# Patient Record
Sex: FEMALE
[De-identification: names, ages, dates, MRNs, and addresses within clinical notes are randomized per-mention and may not be internally consistent; named-entity substitution may affect disease eponyms.]

---

## 2018-11-12 PROBLEM — Z00.00 ENCOUNTER FOR PREVENTIVE HEALTH EXAMINATION: Status: ACTIVE | Noted: 2018-11-12

## 2019-01-03 ENCOUNTER — APPOINTMENT (OUTPATIENT)
Dept: ENDOCRINOLOGY | Facility: CLINIC | Age: 74
End: 2019-01-03

## 2019-03-28 ENCOUNTER — RECORD ABSTRACTING (OUTPATIENT)
Age: 74
End: 2019-03-28

## 2019-03-28 DIAGNOSIS — Z82.49 FAMILY HISTORY OF ISCHEMIC HEART DISEASE AND OTHER DISEASES OF THE CIRCULATORY SYSTEM: ICD-10-CM

## 2019-03-28 RX ORDER — MIRTAZAPINE 15 MG/1
15 TABLET, FILM COATED ORAL
Refills: 0 | Status: ACTIVE | COMMUNITY

## 2019-03-28 RX ORDER — INSULIN DETEMIR 100 [IU]/ML
INJECTION, SOLUTION SUBCUTANEOUS
Refills: 0 | Status: ACTIVE | COMMUNITY

## 2019-03-28 RX ORDER — AMLODIPINE BESYLATE 5 MG/1
5 TABLET ORAL DAILY
Refills: 0 | Status: ACTIVE | COMMUNITY

## 2019-03-28 RX ORDER — MULTIVITAMIN
CAPSULE ORAL
Refills: 0 | Status: ACTIVE | COMMUNITY

## 2019-03-28 RX ORDER — GABAPENTIN 100 MG/1
CAPSULE ORAL
Refills: 0 | Status: ACTIVE | COMMUNITY

## 2019-03-28 RX ORDER — CLOPIDOGREL BISULFATE 75 MG/1
75 TABLET, FILM COATED ORAL
Refills: 0 | Status: ACTIVE | COMMUNITY

## 2019-03-28 RX ORDER — NITROGLYCERIN 0.4 MG/1
0.4 TABLET SUBLINGUAL
Refills: 0 | Status: ACTIVE | COMMUNITY

## 2019-03-28 RX ORDER — CITALOPRAM HYDROBROMIDE 10 MG/1
TABLET, FILM COATED ORAL
Refills: 0 | Status: ACTIVE | COMMUNITY

## 2019-03-28 RX ORDER — ATORVASTATIN CALCIUM 10 MG/1
10 TABLET, FILM COATED ORAL DAILY
Refills: 0 | Status: ACTIVE | COMMUNITY

## 2019-03-28 RX ORDER — LISINOPRIL 30 MG/1
TABLET ORAL
Refills: 0 | Status: ACTIVE | COMMUNITY

## 2019-03-28 RX ORDER — CARVEDILOL 6.25 MG/1
6.25 TABLET, FILM COATED ORAL
Refills: 0 | Status: ACTIVE | COMMUNITY

## 2019-03-28 RX ORDER — REPAGLINIDE 2 MG/1
TABLET ORAL
Refills: 0 | Status: ACTIVE | COMMUNITY

## 2019-03-28 RX ORDER — OMEPRAZOLE 20 MG/1
20 CAPSULE, DELAYED RELEASE ORAL
Refills: 0 | Status: ACTIVE | COMMUNITY

## 2019-03-28 RX ORDER — MULTIVITAMIN
TABLET ORAL
Refills: 0 | Status: ACTIVE | COMMUNITY

## 2019-03-28 RX ORDER — HALOPERIDOL 0.5 MG/1
0.5 TABLET ORAL
Refills: 0 | Status: ACTIVE | COMMUNITY

## 2019-03-28 RX ORDER — SITAGLIPTIN 100 MG/1
100 TABLET, FILM COATED ORAL
Refills: 0 | Status: ACTIVE | COMMUNITY

## 2019-03-28 RX ORDER — DOCUSATE SODIUM 100 MG/1
100 CAPSULE ORAL
Refills: 0 | Status: ACTIVE | COMMUNITY

## 2019-03-28 RX ORDER — GUAIFENESIN 1200 MG/1
TABLET, EXTENDED RELEASE ORAL
Refills: 0 | Status: ACTIVE | COMMUNITY

## 2019-04-01 ENCOUNTER — APPOINTMENT (OUTPATIENT)
Dept: ENDOCRINOLOGY | Facility: CLINIC | Age: 74
End: 2019-04-01
Payer: COMMERCIAL

## 2019-04-01 VITALS
DIASTOLIC BLOOD PRESSURE: 78 MMHG | SYSTOLIC BLOOD PRESSURE: 126 MMHG | BODY MASS INDEX: 33.65 KG/M2 | WEIGHT: 222 LBS | HEART RATE: 64 BPM | HEIGHT: 68 IN

## 2019-04-01 DIAGNOSIS — E78.00 PURE HYPERCHOLESTEROLEMIA, UNSPECIFIED: ICD-10-CM

## 2019-04-01 DIAGNOSIS — E01.0 IODINE-DEFICIENCY RELATED DIFFUSE (ENDEMIC) GOITER: ICD-10-CM

## 2019-04-01 DIAGNOSIS — I10 ESSENTIAL (PRIMARY) HYPERTENSION: ICD-10-CM

## 2019-04-01 DIAGNOSIS — E11.9 TYPE 2 DIABETES MELLITUS W/OUT COMPLICATIONS: ICD-10-CM

## 2019-04-01 DIAGNOSIS — E55.9 VITAMIN D DEFICIENCY, UNSPECIFIED: ICD-10-CM

## 2019-04-01 LAB — GLUCOSE BLDC GLUCOMTR-MCNC: 156

## 2019-04-01 PROCEDURE — 99214 OFFICE O/P EST MOD 30 MIN: CPT

## 2019-04-01 PROCEDURE — 82962 GLUCOSE BLOOD TEST: CPT

## 2019-04-01 NOTE — HISTORY OF PRESENT ILLNESS
[FreeTextEntry1] :  72 yo  lady with dementia in wheelchair, lives in NH, coming for f/u DM2for at least 30 yrs likely DR per daughter, CAD\par      daughter today here with the patient brings med list, BS log\par also labs results brought in \par        PMHx CAD Dr Laila George , s/p transaortic valce replacement 2016 PVD, Dementia, HTN, HLD, Type 2 DM \par        last EGFR 49 8/8, Cr\par        Type 2 DM many years ago on routine blood work. Father had IDDM and mother on metformin\par        HgbA1c has improved down to 6.8% 18\par        has also anemia Hg 10.5\par        Currently:\par        on Levemir 5units qhs\par        Novolog SS \par        101-150 4units\par        151-200 6units\par        201-250 8units etc \par bedtime Novolog starts at 250 and above takes only 2units per med list \par my recommendations from last visit not followed see my last note\par        Victoza 1.8mg SC weekly \par        Januvia 100mg PO QDaily\par        No CP, SOB, N,V, Abdominal pain, Constipation or diarrhea\par        Checks BG levels AC/HS\par        Fastin-160\par        Pre lunch: 266-317, 198\par      \par        Bedtime:\par        190-300 \par per BS log they checked their sugar only fasting and at bedtime\par \par        Secondary prevention\par        Cardiology May/2018. Doris. Had valve replacement. \par        Lisinopril 2.5mg PO Qdaily and ASA/Plavix daily\par        Cr 1.0, K 5.2, eGFR 51\par        LDL: 79 on atorvostatin, LFTs nl 106 on 3/19 , \par        Opthamology: Uses glasses. No blurry vision or loss vision. \par        Podiatry: Q2-3 months. \par        Dental on site. \par        TSH 1.43 18\par        total cholesterol 127.

## 2019-04-01 NOTE — PHYSICAL EXAM
[Alert] : alert [No Acute Distress] : no acute distress [Well Nourished] : well nourished [Well Developed] : well developed [Normal Sclera/Conjunctiva] : normal sclera/conjunctiva [EOMI] : extra ocular movement intact [No Proptosis] : no proptosis [Normal Oropharynx] : the oropharynx was normal [No Respiratory Distress] : no respiratory distress [No Accessory Muscle Use] : no accessory muscle use [Clear to Auscultation] : lungs were clear to auscultation bilaterally [Normal Rate] : heart rate was normal  [Normal S1, S2] : normal S1 and S2 [Regular Rhythm] : with a regular rhythm [Pedal Pulses Normal] : the pedal pulses are present [No Edema] : there was no peripheral edema [Normal Bowel Sounds] : normal bowel sounds [Not Tender] : non-tender [Soft] : abdomen soft [Not Distended] : not distended [Post Cervical Nodes] : posterior cervical nodes [Anterior Cervical Nodes] : anterior cervical nodes [Axillary Nodes] : axillary nodes [Normal] : normal and non tender [No Spinal Tenderness] : no spinal tenderness [Spine Straight] : spine straight [No Stigmata of Cushings Syndrome] : no stigmata of cushings syndrome [Normal Gait] : normal gait [Normal Strength/Tone] : muscle strength and tone were normal [No Rash] : no rash [Acanthosis Nigricans] : no acanthosis nigricans [Normal Reflexes] : deep tendon reflexes were 2+ and symmetric [No Tremors] : no tremors [Oriented x3] : oriented to person, place, and time [de-identified] : mild thyromegaly

## 2019-04-19 ENCOUNTER — HOSPITAL ENCOUNTER (OUTPATIENT)
Dept: HOSPITAL 74 - JER | Age: 74
Setting detail: OBSERVATION
LOS: 1 days | Discharge: NURSING, REHAB OR LONG-TERM CARE | End: 2019-04-20
Payer: COMMERCIAL

## 2019-04-19 VITALS — BODY MASS INDEX: 27.4 KG/M2

## 2019-04-19 DIAGNOSIS — I35.0: ICD-10-CM

## 2019-04-19 DIAGNOSIS — R07.9: Primary | ICD-10-CM

## 2019-04-19 DIAGNOSIS — E11.9: ICD-10-CM

## 2019-04-19 DIAGNOSIS — Z87.11: ICD-10-CM

## 2019-04-19 DIAGNOSIS — F03.90: ICD-10-CM

## 2019-04-19 DIAGNOSIS — I25.119: ICD-10-CM

## 2019-04-19 DIAGNOSIS — I73.9: ICD-10-CM

## 2019-04-19 DIAGNOSIS — F32.9: ICD-10-CM

## 2019-04-19 DIAGNOSIS — E78.5: ICD-10-CM

## 2019-04-19 DIAGNOSIS — I11.0: ICD-10-CM

## 2019-04-19 DIAGNOSIS — Z79.82: ICD-10-CM

## 2019-04-19 DIAGNOSIS — F09: ICD-10-CM

## 2019-04-19 DIAGNOSIS — Z86.718: ICD-10-CM

## 2019-04-19 DIAGNOSIS — Z79.4: ICD-10-CM

## 2019-04-19 DIAGNOSIS — I50.1: ICD-10-CM

## 2019-04-19 LAB
ALBUMIN SERPL-MCNC: 3.9 G/DL (ref 3.4–5)
ALP SERPL-CCNC: 92 U/L (ref 45–117)
ALT SERPL-CCNC: 18 U/L (ref 13–61)
ANION GAP SERPL CALC-SCNC: 4 MMOL/L (ref 8–16)
APPEARANCE UR: CLEAR
APTT BLD: 31.3 SECONDS (ref 25.2–36.5)
AST SERPL-CCNC: 13 U/L (ref 15–37)
BACTERIA #/AREA URNS HPF: 1761.8 /HPF
BASOPHILS # BLD: 0.7 % (ref 0–2)
BILIRUB SERPL-MCNC: 0.3 MG/DL (ref 0.2–1)
BILIRUB UR STRIP.AUTO-MCNC: NEGATIVE MG/DL
BUN SERPL-MCNC: 17 MG/DL (ref 7–18)
CALCIUM SERPL-MCNC: 9.3 MG/DL (ref 8.5–10.1)
CASTS #/AREA URNS LPF: 4 /LPF (ref 0–8)
CHLORIDE SERPL-SCNC: 107 MMOL/L (ref 98–107)
CO2 SERPL-SCNC: 27 MMOL/L (ref 21–32)
COLOR UR: YELLOW
CREAT SERPL-MCNC: 0.9 MG/DL (ref 0.55–1.3)
DEPRECATED RDW RBC AUTO: 13.1 % (ref 11.6–15.6)
EOSINOPHIL # BLD: 1.2 % (ref 0–4.5)
EPITH CASTS URNS QL MICRO: 2 /HPF
GLUCOSE SERPL-MCNC: 158 MG/DL (ref 74–106)
HCT VFR BLD CALC: 35.4 % (ref 32.4–45.2)
HGB BLD-MCNC: 11.8 GM/DL (ref 10.7–15.3)
INR BLD: 1.17 (ref 0.83–1.09)
KETONES UR QL STRIP: (no result)
LEUKOCYTE ESTERASE UR QL STRIP.AUTO: (no result)
LYMPHOCYTES # BLD: 16.8 % (ref 8–40)
MCH RBC QN AUTO: 29.8 PG (ref 25.7–33.7)
MCHC RBC AUTO-ENTMCNC: 33.5 G/DL (ref 32–36)
MCV RBC: 88.9 FL (ref 80–96)
MONOCYTES # BLD AUTO: 8.1 % (ref 3.8–10.2)
NEUTROPHILS # BLD: 73.2 % (ref 42.8–82.8)
NITRITE UR QL STRIP: POSITIVE
PH UR: 5 [PH] (ref 5–8)
PLATELET # BLD AUTO: 244 K/MM3 (ref 134–434)
PMV BLD: 9.4 FL (ref 7.5–11.1)
POTASSIUM SERPLBLD-SCNC: 4.9 MMOL/L (ref 3.5–5.1)
PROT SERPL-MCNC: 7.3 G/DL (ref 6.4–8.2)
PROT UR QL STRIP: (no result)
PROT UR QL STRIP: NEGATIVE
PT PNL PPP: 13.8 SEC (ref 9.7–13)
RBC # BLD AUTO: 0 /HPF (ref 0–4)
RBC # BLD AUTO: 3.98 M/MM3 (ref 3.6–5.2)
SODIUM SERPL-SCNC: 138 MMOL/L (ref 136–145)
SP GR UR: 1.03 (ref 1.01–1.03)
UROBILINOGEN UR STRIP-MCNC: 0.2 MG/DL (ref 0.2–1)
WBC # BLD AUTO: 8.9 K/MM3 (ref 4–10)
WBC # UR AUTO: 2 /HPF (ref 0–5)

## 2019-04-19 PROCEDURE — 3E03329 INTRODUCTION OF OTHER ANTI-INFECTIVE INTO PERIPHERAL VEIN, PERCUTANEOUS APPROACH: ICD-10-PCS

## 2019-04-19 PROCEDURE — 3E013VG INTRODUCTION OF INSULIN INTO SUBCUTANEOUS TISSUE, PERCUTANEOUS APPROACH: ICD-10-PCS

## 2019-04-19 PROCEDURE — 3E013GC INTRODUCTION OF OTHER THERAPEUTIC SUBSTANCE INTO SUBCUTANEOUS TISSUE, PERCUTANEOUS APPROACH: ICD-10-PCS

## 2019-04-19 PROCEDURE — 3E023GC INTRODUCTION OF OTHER THERAPEUTIC SUBSTANCE INTO MUSCLE, PERCUTANEOUS APPROACH: ICD-10-PCS

## 2019-04-19 PROCEDURE — G0378 HOSPITAL OBSERVATION PER HR: HCPCS

## 2019-04-19 RX ADMIN — HEPARIN SODIUM SCH UNIT: 5000 INJECTION, SOLUTION INTRAVENOUS; SUBCUTANEOUS at 22:57

## 2019-04-19 RX ADMIN — CARVEDILOL SCH MG: 6.25 TABLET, FILM COATED ORAL at 22:57

## 2019-04-19 RX ADMIN — INSULIN ASPART SCH: 100 INJECTION, SOLUTION INTRAVENOUS; SUBCUTANEOUS at 22:58

## 2019-04-19 NOTE — HP
CHIEF COMPLAINT: chest pain 





PCP: Vinayak  





HISTORY OF PRESENT ILLNESS:


73 year old female with baseline dementia from [facility] brought in to 

hospital because of AMS and complaining of chest pain which started about 

afternoon today. Chest pain was allegedly improved with nitroglycerin in 

nursing home. At bedside patient denied chest pain and did not appear to be in 

distress. Most of history obtained from daughter who is a PA. 





ER course was notable for:


(1) cxr 


(2)ekg 


(3) head ct 





Recent Travel: no 





PAST MEDICAL HISTORY: HTN, HLD, DVT, dementia, anemia, depression, CAD s/p 2 

stents- 2016, PVD, DM, AV s/p TAVR - 2016  moderate to severe and peptic ulcer 

disease





PAST SURGICAL HISTORY: as above 











Social History:


Smoking:no 


Alcohol: no no  


Drugs: 





Family History: unknown 


Allergies





No Known Allergies Allergy (Verified 04/19/19 17:09)


 








HOME MEDICATIONS:


 Home Medications











 Medication  Instructions  Recorded


 


Acetaminophen [Mapap] 650 mg PO Q6H PRN 12/29/14


 


Citalopram Hydrobromide [Celexa -] 20 mg PO DAILY 12/29/14


 


Gabapentin 300 mg PO DAILY 12/29/14


 


Multivitamins [Multivit ([facility] 1 tab PO DAILY 12/29/14





Formulary)]  


 


Ascorbate Calcium [Vitamin C] 500 mg PO DAILY 05/21/16


 


Cholecalciferol (Vitamin D3) 1,000 unit PO DAILY 05/21/16





[Vitamin D]  


 


Haloperidol [Haldol -] 0.25 mg PO BID 05/21/16


 


Sitagliptin Phosphate [Januvia] 100 mg PO DAILY 05/21/16


 


Aspirin 81 mg PO DAILY 04/19/19


 


Atorvastatin Calcium 10 mg PO DAILY 04/19/19


 


Carvedilol 6.25 mg PO BID 04/19/19


 


Clopidogrel Bisulfate [Clopidogrel] 75 mg PO DAILY 04/19/19


 


Cyanocobalamin (Vitamin B-12) 1,000 mcg IJ MONTHLY 04/19/19





[Cyanocobalamin Injection]  


 


Docusate Sodium [Colace -] 300 mg PO HS 04/19/19


 


Ferrous Sulfate 325 mg PO DAILY 04/19/19


 


Insulin Aspart [Novolog] 100 unit SQ AC 04/19/19


 


Insulin Detemir [Levemir Flextouch] 5 unit SQ HS 04/19/19


 


Lactobacillus Acidophilus [Bacid -] 1 each PO DAILY 04/19/19


 


Liraglutide [Victoza 3-Vernon] 1.8 mg SQ DAILY 04/19/19


 


Omeprazole 20 mg PO DAILY 04/19/19


 


Sennosides [Senna] 8.6 mg PO HS 04/19/19








REVIEW OF SYSTEMS - unable to obtain as patient has severe dementia  





PHYSICAL EXAMINATION


 Vital Signs - 24 hr











  04/19/19 04/19/19





  17:00 21:06


 


Temperature 98.6 F 98.5 F


 


Pulse Rate 80 


 


Pulse Rate [  88





Left Radial]  


 


Respiratory 16 19





Rate  


 


Blood Pressure 170/69 


 


Blood Pressure  164/79





[Right Arm]  


 


O2 Sat by Pulse 100 97





Oximetry (%)  











GENERAL: Awake, alert, disoriented, demented, bedbound 


HEAD: Normal with no signs of trauma.


EYES: Pupils equal, round and reactive to light, extraocular movements intact, 

sclera anicteric, conjunctiva clear. No lid lag.


EARS, NOSE, THROAT: Ears normal, nares patent, oropharynx clear without 

exudates. Moist mucous membranes.


NECK: Normal range of motion, supple without lymphadenopathy, JVD, or masses.


LUNGS: Breath sounds equal, clear to auscultation bilaterally. No wheezes, and 

no crackles. No accessory muscle use.


HEART: Regular rate and rhythm, normal S1 and S2+ systolic murmur 


ABDOMEN: Soft, nontender, not distended, normoactive bowel sounds, no guarding, 

no rebound, no masses. 


MUSCULOSKELETAL: Normal range of motion at all joints. No bony deformities or 

tenderness. No CVA tenderness.


UPPER EXTREMITIES: 2+ pulses, warm, well-perfused. No cyanosis. No clubbing. No 

peripheral edema.


LOWER EXTREMITIES: 2+ pulses, warm, well-perfused. No calf tenderness. No 

peripheral edema. 


NEUROLOGICAL:  Cranial nerves II-XII intact. Normal speech.


PSYCHIATRIC: dementia 


SKIN: Warm, dry, normal turgor, no rashes or lesions noted, normal capillary 

refill. 











 Laboratory Results - last 24 hr











  04/19/19 04/19/19 04/19/19





  17:39 17:40 17:40


 


WBC   8.9 


 


RBC   3.98 


 


Hgb   11.8 


 


Hct   35.4  D 


 


MCV   88.9 


 


MCH   29.8  D 


 


MCHC   33.5 


 


RDW   13.1  D 


 


Plt Count   244 


 


MPV   9.4  D 


 


Absolute Neuts (auto)   6.5 


 


Neutrophils %   73.2 


 


Lymphocytes %   16.8 


 


Monocytes %   8.1 


 


Eosinophils %   1.2 


 


Basophils %   0.7 


 


Nucleated RBC %   0 


 


PT with INR   


 


INR   


 


PTT (Actin FS)   


 


Sodium    138


 


Potassium    4.9


 


Chloride    107


 


Carbon Dioxide    27


 


Anion Gap    4 L


 


BUN    17


 


Creatinine    0.9


 


Creat Clearance w eGFR    61.21


 


Random Glucose    158 H


 


Calcium    9.3


 


Total Bilirubin    0.3


 


AST    13 L


 


ALT    18


 


Alkaline Phosphatase    92


 


Troponin I    < 0.02


 


Total Protein    7.3


 


Albumin    3.9


 


Urine Color  Yellow  


 


Urine Appearance  Clear  


 


Urine pH  5.0  


 


Ur Specific Gravity  1.027  


 


Urine Protein  1+ H  


 


Urine Glucose (UA)  Negative  


 


Urine Ketones  Trace H  


 


Urine Blood  Negative  


 


Urine Nitrite  Positive H  


 


Urine Bilirubin  Negative  


 


Urine Urobilinogen  0.2  


 


Ur Leukocyte Esterase  Trace  


 


Urine WBC (Auto)  2  


 


Urine RBC (Auto)  0  


 


Urine Casts (Auto)  4  


 


U Epithel Cells (Auto)  2.0  


 


Urine Bacteria (Auto)  1761.8  














  04/19/19





  17:40


 


WBC 


 


RBC 


 


Hgb 


 


Hct 


 


MCV 


 


MCH 


 


MCHC 


 


RDW 


 


Plt Count 


 


MPV 


 


Absolute Neuts (auto) 


 


Neutrophils % 


 


Lymphocytes % 


 


Monocytes % 


 


Eosinophils % 


 


Basophils % 


 


Nucleated RBC % 


 


PT with INR  13.80 H


 


INR  1.17 H


 


PTT (Actin FS)  31.3


 


Sodium 


 


Potassium 


 


Chloride 


 


Carbon Dioxide 


 


Anion Gap 


 


BUN 


 


Creatinine 


 


Creat Clearance w eGFR 


 


Random Glucose 


 


Calcium 


 


Total Bilirubin 


 


AST 


 


ALT 


 


Alkaline Phosphatase 


 


Troponin I 


 


Total Protein 


 


Albumin 


 


Urine Color 


 


Urine Appearance 


 


Urine pH 


 


Ur Specific Gravity 


 


Urine Protein 


 


Urine Glucose (UA) 


 


Urine Ketones 


 


Urine Blood 


 


Urine Nitrite 


 


Urine Bilirubin 


 


Urine Urobilinogen 


 


Ur Leukocyte Esterase 


 


Urine WBC (Auto) 


 


Urine RBC (Auto) 


 


Urine Casts (Auto) 


 


U Epithel Cells (Auto) 


 


Urine Bacteria (Auto) 





Imaging studies reviewed 


ekg with no acute ischemic changes 





ASSESSMENT/PLAN:


#Chest pain- r/o ACS given prior CAD and TAVR. May also be GERD/dyspepsia 


-tele/obs 


-trend troponins 


-echo 


-NGL SL prn 


-consider cardiology evaluation


-PPI empirically 





#CAD history 


-c/w home dose plavix, ASA 


-statin 


-metoprolol home dose  





#DM 


-insulin novolog sliding scale 


-levemir 


-a1c 


-diabetic diet 





#Dementia 


-bed rest 


-fall precautions 





#DVT ppx - prior hx of DVT- off coumadin as per med list 


-heparin sc for moderate to high risk patient 


-confirm that off AC in am 





Visit type





- Emergency Visit


Emergency Visit: Yes


ED Registration Date: 04/19/19


Care time: The patient presented to the Emergency Department on the above date 

and was hospitalized for further evaluation of their emergent condition.





- New Patient


This patient is new to me today: Yes


Date on this admission: 04/19/19





- Critical Care


Critical Care patient: No

## 2019-04-19 NOTE — PDOC
Documentation entered by Dorothea Houston SCRIBE, acting as scribe for Roddy Greenwood MD.








Roddy Greenwood MD:  This documentation has been prepared by the Celso abdi Collisia, SCRIBE, under my direction and personally reviewed by me in its 

entirety.  I confirm that the documentation accurately reflects all work, 

treatment, procedures, and medical decision making performed by me.  





Attending Attestation





- Resident


Resident Name: Patricia Malone





- ED Attending Attestation


I have performed the following: I have examined & evaluated the patient, The 

case was reviewed & discussed with the resident, I agree w/resident's findings 

& plan, Exceptions are as noted





- HPI


HPI: 





04/19/19 18:07


The patient is a 73 year old female with a significant past medical history of 

hypertension, hyperlipidemia, dementia, anemia, depression, CAD, PVD, diabetes, 

and peptic ulcers who presents to the emergency department via EMS from [facility] with chest pain since earlier today. Pt's daughter states that the 

patient received nitro prior to arrival to ED which seemed to have relieved her 

reported chest pain. The patient's daughter also reports that the patient has 

also been altered which often occurs when the patient has a UTI. As per nursing 

home, the patient was noted with psychotic episodes-kicking her overhead table 

down, stripping her bed covers and throwing everything on the floor frequently 

screaming out very loudly and displaying visual hallucination. NH staff also 

state that the patient began to clutch her chest and patient received 1 dose of 

0.4 mg of Nitroglycerin sublingual. No other symptoms or complaints are 

reported but history is limited due to pt's dementia. 











- Physicial Exam


PE: 





04/19/19 19:34


agree with resident exam 





- Medical Decision Making





04/19/19 19:37


72yo F with MMP including dementia and CAD presents to the ED with AMS, 

agitation and CP. Vitals with elevated BP, otherwise unremarkable. 


With regards to AMS, plan to do broad w/u including metabolic vs neurologic vs 

infectious vs ischemic pathology


With regards to CP, pt with elevated heart score, plan to check cardiac enzymes

, admit for cardiac w/u.





**Heart Score/ECG Review


  ** #1





04/19/19 19:35


Twelve-lead EKG was performed and reviewed by me. NSR, rate 79, normal axis. No 

YVES

## 2019-04-19 NOTE — PDOC
History of Present Illness





- General


Chief Complaint: Chest Pain


Stated Complaint: Chest Pain


Time Seen by Provider: 04/19/19 17:03


History Source: Family, Nursing Home Records


Exam Limitations: No Limitations





- History of Present Illness


Initial Comments: 





04/19/19 17:04


73 year old female from [facility] with significant medical hx of HTN, HLD, 

DVT,dementia, anemia, depression, CAD, PVD, DM, Aortic valve sclerosis- 

moderate to severe and peptic ulcer disease presents with altered mental status 

and complaints of chest pain as witnessed by the staff at [facility]. The staff 

informed daughter that the patient began complaining of chest pain than started 

yelling and throwing things. The physician at the facility gave the patient 

nitro and sent the patient to the ED. The daughter notes that in the past the 

patient has become altered due to a UTI and notes that the patients episode was 

quite different than her typical dementia. The patient reports resolution of 

chest pain at bedside. No recent fever, n/v/d/c, the patient has baseline 

urinary and fecal incontinence and wears a diaper. 








Past History





- Past Medical History


Allergies/Adverse Reactions: 


 Allergies











Allergy/AdvReac Type Severity Reaction Status Date / Time


 


No Known Allergies Allergy   Verified 04/19/19 17:09











Home Medications: 


Ambulatory Orders





Acetaminophen [Mapap] 650 mg PO Q6H PRN 12/29/14 


Citalopram Hydrobromide [Celexa -] 20 mg PO DAILY 12/29/14 


Gabapentin 300 mg PO DAILY 12/29/14 


Multivitamins [Multivit ([facility] Formulary)] 1 tab PO DAILY 12/29/14 


Cholecalciferol (Vitamin D3) [Vitamin D3] 1,000 unit PO DAILY 05/21/16 


Haloperidol [Haldol -] 0.25 mg PO BID 05/21/16 


Sitagliptin Phosphate [Januvia] 100 mg PO DAILY 05/21/16 


Aspirin 81 mg PO DAILY 04/19/19 


Atorvastatin Calcium 10 mg PO DAILY 04/19/19 


Carvedilol 6.25 mg PO BID 04/19/19 


Clopidogrel Bisulfate [Clopidogrel] 75 mg PO DAILY 04/19/19 


Cyanocobalamin (Vitamin B-12) [Cyanocobalamin Injection] 1,000 mcg IJ MONTHLY 04 /19/19 


Docusate Sodium [Colace -] 300 mg PO HS 04/19/19 


Ferrous Sulfate 325 mg PO DAILY 04/19/19 


Insulin Aspart [Novolog] 100 unit SQ AC 04/19/19 


Insulin Detemir [Levemir Flextouch] 5 unit SQ HS 04/19/19 


Lactobacillus Acidophilus [Bacid -] 1 each PO DAILY 04/19/19 


Liraglutide [Victoza 3-Vernon] 1.8 mg SQ DAILY 04/19/19 


Omeprazole 20 mg PO DAILY 04/19/19 


Sennosides [Senna] 8.6 mg PO HS 04/19/19 


Heparin - 5,000 unit SQ BID  vial 04/20/19 


Insulin (Levemir) [Levemir Vial] 5 units SQ HS  units 04/20/19 


Nitroglycerin Sublingual [Nitrostat -] 0.4 mg SL Q5M PRN  tab 04/20/19 








Anemia: No


Asthma: No


Cancer: No


Cardiac Disorders: No


CVA: No


COPD: No


CHF: No


DVT: Yes


Dementia: Yes


Diabetes: Yes


GI Disorders: Yes (GERD)


 Disorders: No


HTN: No


Hypercholesterolemia: Yes


Liver Disease: No


Seizures: No


Thyroid Disease: No





- Surgical History


Abdominal Surgery: No


Appendectomy: No


Cardiac Surgery: No


Cholecystectomy: No


Lung Surgery: No


Neurologic Surgery: No


Orthopedic Surgery: No





- Suicide/Smoking/Psychosocial Hx


Smoking History: Unknown if ever smoked


Have you smoked in the past 12 months: No


Number of Cigarettes Smoked Daily: 0


Hx Alcohol Use: No


Drug/Substance Use Hx: No


Substance Use Type: None


Hx Substance Use Treatment: No





**Review of Systems





- Review of Systems


Able to Perform ROS?: Yes


Comments:: 





04/19/19 18:25


GENERAL/CONSTITUTIONAL: No fever or chills. No weakness.


HEAD, EYES, EARS, NOSE AND THROAT: No change in vision. No ear pain or 

discharge. No sore throat.


CARDIOVASCULAR: No shortness of breath


RESPIRATORY: No cough, wheezing, or hemoptysis.


GASTROINTESTINAL: No nausea, vomiting, diarrhea or constipation.


GENITOURINARY: No dysuria, frequency, or change in urination.


MUSCULOSKELETAL: No joint or muscle swelling or pain. No neck or back pain.


SKIN: No rash


NEUROLOGIC: No headache, vertigo, loss of consciousness, or change in strength/

sensation.


ENDOCRINE: No increased thirst. No abnormal weight change


HEMATOLOGIC/LYMPHATIC: No anemia, easy bleeding, or history of blood clots.


ALLERGIC/IMMUNOLOGIC: No hives or skin allergy.








*Physical Exam





- Physical Exam


Comments: 





04/19/19 18:25


GENERAL: dementia, in no acute distress


HEAD: No signs of trauma, normocephalic, atraumatic 


EYES: PERRLA, EOMI, sclera anicteric, conjunctiva clear


ENT: oropharynx clear without exudates. Moist mucosa


NECK: Normal ROM, supple


LUNGS: No distress, speaks full sentences, clear to auscultation bilaterally 


HEART: Regular rate and rhythm, normal S1 and S2, no murmurs, rubs or gallops, 

peripheral pulses normal and equal bilaterally. 


ABDOMEN: Soft, nontender, normoactive bowel sounds.  No guarding, no rebound.  

No masses


EXTREMITIES : Normal inspection, Normal range of motion, no edema.  No clubbing 

or cyanosis. R heel stage 1 well healing ulcer w/ pressure wound dressing.


NEUROLOGICAL: Cranial nerves II through XII grossly intact.  Normal speech, no 

focal sensorimotor deficits 


SKIN: Warm, Dry, normal turgor, no rashes or lesions noted











ED Treatment Course





- LABORATORY


CBC & Chemistry Diagram: 


 04/20/19 06:15





 04/20/19 06:00





Medical Decision Making





- Medical Decision Making





04/19/19 18:24


3 year old female from [facility] with significant medical hx of HTN, HLD, 

DVT,dementia, anemia, depression, CAD (on Coumadin), PVD, DM, Aortic valve 

sclerosis- moderate to severe and peptic ulcer disease presents with altered 

mental status and complaints of chest pain as witnessed by the staff at 

[facility]. The staff informed daughter that the patient began complaining of 

chest pain than started yelling and throwing things. The physician at the 

facility gave the patient nitro and sent the patient to the ED. The daughter 

notes that in the past the patient has become altered due to a UTI and notes 

that the patients episode was quite different than her typical dementia. 





ED Course:


consider acs vs arrythmia 


ams consider infectious vs electrolyte vs intracranial pathology





labs wnl


UTI apparent


head CT unremarkable


trop negative 


EKG withut ST elevations


however patient with HEART score ~5 and will reqire telle obs admission





Discssed case with inpatient team resident Dr. Wong 





Patient admitted to medicine. 











*DC/Admit/Observation/Transfer


Diagnosis at time of Disposition: 


 Chest pain








- Discharge Dispostion


Disposition: SKILLED NURSING FACILITY


Condition at time of disposition: Stable


Decision to Admit order: Yes





- Referrals





- Patient Instructions





- Post Discharge Activity

## 2019-04-20 VITALS — HEART RATE: 86 BPM | DIASTOLIC BLOOD PRESSURE: 76 MMHG | SYSTOLIC BLOOD PRESSURE: 142 MMHG

## 2019-04-20 VITALS — TEMPERATURE: 98.8 F

## 2019-04-20 LAB
ANION GAP SERPL CALC-SCNC: 6 MMOL/L (ref 8–16)
BUN SERPL-MCNC: 12 MG/DL (ref 7–18)
CALCIUM SERPL-MCNC: 9.3 MG/DL (ref 8.5–10.1)
CHLORIDE SERPL-SCNC: 104 MMOL/L (ref 98–107)
CO2 SERPL-SCNC: 28 MMOL/L (ref 21–32)
CREAT SERPL-MCNC: 0.8 MG/DL (ref 0.55–1.3)
DEPRECATED RDW RBC AUTO: 13 % (ref 11.6–15.6)
GLUCOSE SERPL-MCNC: 159 MG/DL (ref 74–106)
HCT VFR BLD CALC: 35.6 % (ref 32.4–45.2)
HGB BLD-MCNC: 12.1 GM/DL (ref 10.7–15.3)
MAGNESIUM SERPL-MCNC: 1.7 MG/DL (ref 1.8–2.4)
MCH RBC QN AUTO: 29.7 PG (ref 25.7–33.7)
MCHC RBC AUTO-ENTMCNC: 33.9 G/DL (ref 32–36)
MCV RBC: 87.6 FL (ref 80–96)
PLATELET # BLD AUTO: 240 K/MM3 (ref 134–434)
PMV BLD: 9 FL (ref 7.5–11.1)
POTASSIUM SERPLBLD-SCNC: 4.5 MMOL/L (ref 3.5–5.1)
RBC # BLD AUTO: 4.06 M/MM3 (ref 3.6–5.2)
SODIUM SERPL-SCNC: 138 MMOL/L (ref 136–145)
WBC # BLD AUTO: 7.7 K/MM3 (ref 4–10)

## 2019-04-20 RX ADMIN — INSULIN ASPART SCH UNIT: 100 INJECTION, SOLUTION INTRAVENOUS; SUBCUTANEOUS at 11:55

## 2019-04-20 RX ADMIN — INSULIN ASPART SCH: 100 INJECTION, SOLUTION INTRAVENOUS; SUBCUTANEOUS at 06:10

## 2019-04-20 RX ADMIN — HEPARIN SODIUM SCH UNIT: 5000 INJECTION, SOLUTION INTRAVENOUS; SUBCUTANEOUS at 09:03

## 2019-04-20 RX ADMIN — INSULIN ASPART SCH UNIT: 100 INJECTION, SOLUTION INTRAVENOUS; SUBCUTANEOUS at 17:11

## 2019-04-20 RX ADMIN — CARVEDILOL SCH MG: 6.25 TABLET, FILM COATED ORAL at 09:02

## 2019-04-20 NOTE — PN
Progress Note (short form)





- Note


Progress Note: 





 Vital Signs











Temp  98.5 F   04/20/19 08:47


 


Pulse  83   04/20/19 08:47


 


Resp  20   04/20/19 08:47


 


BP  170/85   04/20/19 08:47


 


Pulse Ox  95   04/20/19 08:49








 Intake & Output











 04/19/19 04/19/19 04/20/19





 11:59 23:59 11:59


 


Intake Total  100 


 


Balance  100 


 


Weight  170 lb 


 


Intake:   


 


  Oral  100 


 


Other:   


 


  Voiding Method  Incontinent Diaper


 


  # Unmeasured Voids   


 


    Void   1


 


  Height  5 ft 6 in 


 


  Body Mass Index (BMI)  27.4 


 


  Weight Measurement Method  Stated by Caregiver 








Active Medications





Aspirin (Asa -)  81 mg PO DAILY [facility]


   Last Admin: 04/20/19 09:02 Dose:  81 mg


Atorvastatin Calcium (Lipitor -)  10 mg PO [facility]


Carvedilol (Coreg -)  6.25 mg PO BID [facility]


   Last Admin: 04/20/19 09:02 Dose:  6.25 mg


Cholecalciferol (Vitamin D3 -)  1,000 unit PO DAILY [facility]


   Last Admin: 04/20/19 09:02 Dose:  1,000 unit


Citalopram Hydrobromide (Celexa -)  20 mg PO DAILY [facility]


   Last Admin: 04/20/19 09:02 Dose:  20 mg


Clopidogrel Bisulfate (Plavix -)  75 mg PO DAILY [facility]


   Last Admin: 04/20/19 09:02 Dose:  75 mg


Docusate Sodium (Colace -)  300 mg PO [facility]


   Last Admin: 04/19/19 22:56 Dose:  300 mg


Gabapentin (Neurontin -)  300 mg PO DAILY [facility]


   Last Admin: 04/20/19 09:02 Dose:  300 mg


Haloperidol (Haldol Liquid -)  0.25 mg PO BID [facility]


   Last Admin: 04/20/19 09:03 Dose:  0.25 mg


Heparin Sodium (Porcine) (Heparin -)  5,000 unit SQ BID [facility]


   Last Admin: 04/20/19 09:03 Dose:  5,000 unit


Insulin Aspart (Novolog Vial Sliding Scale -)  1 vial SQ [facility]; Protocol


   Last Admin: 04/20/19 06:10 Dose:  Not Given


Insulin Detemir (Levemir Vial)  5 units SQ [facility]


   Last Admin: 04/19/19 22:57 Dose:  5 units


Multivitamins/Minerals/Vitamin C (Tab-A-Vit -)  1 tab PO DAILY [facility]


   Last Admin: 04/20/19 09:02 Dose:  1 tab


Nitroglycerin (Nitrostat -)  0.4 mg SL Q5M PRN


   PRN Reason: FOR CHEST PAIN


Pantoprazole Sodium (Protonix -)  40 mg PO DAILY [facility]


   Last Admin: 04/20/19 09:02 Dose:  40 mg


Senna (Senna -)  1 tab PO HS PRN


   PRN Reason: CONSTIPATION





 CBC, BMP





 04/20/19 06:15 





 04/20/19 06:00 





 Abnormal Lab Results











  04/19/19 04/19/19 04/19/19





  17:39 17:40 17:40


 


PT with INR    13.80 H


 


INR    1.17 H


 


Anion Gap   4 L 


 


Random Glucose   158 H 


 


Magnesium   


 


AST   13 L 


 


Urine Protein  1+ H  


 


Urine Ketones  Trace H  


 


Urine Nitrite  Positive H  














  04/20/19





  06:00


 


PT with INR 


 


INR 


 


Anion Gap  6 L


 


Random Glucose  159 H


 


Magnesium  1.7 L


 


AST 


 


Urine Protein 


 


Urine Ketones 


 


Urine Nitrite 








 Laboratory Results - last 24 hr











  04/19/19 04/19/19 04/19/19





  17:39 17:40 17:40


 


WBC   8.9 


 


RBC   3.98 


 


Hgb   11.8 


 


Hct   35.4  D 


 


MCV   88.9 


 


MCH   29.8  D 


 


MCHC   33.5 


 


RDW   13.1  D 


 


Plt Count   244 


 


MPV   9.4  D 


 


Absolute Neuts (auto)   6.5 


 


Neutrophils %   73.2 


 


Lymphocytes %   16.8 


 


Monocytes %   8.1 


 


Eosinophils %   1.2 


 


Basophils %   0.7 


 


Nucleated RBC %   0 


 


PT with INR   


 


INR   


 


PTT (Actin FS)   


 


Sodium    138


 


Potassium    4.9


 


Chloride    107


 


Carbon Dioxide    27


 


Anion Gap    4 L


 


BUN    17


 


Creatinine    0.9


 


Creat Clearance w eGFR    61.21


 


POC Glucometer   


 


Random Glucose    158 H


 


Calcium    9.3


 


Magnesium   


 


Total Bilirubin    0.3


 


AST    13 L


 


ALT    18


 


Alkaline Phosphatase    92


 


Creatine Kinase   


 


Troponin I    < 0.02


 


Total Protein    7.3


 


Albumin    3.9


 


Urine Color  Yellow  


 


Urine Appearance  Clear  


 


Urine pH  5.0  


 


Ur Specific Gravity  1.027  


 


Urine Protein  1+ H  


 


Urine Glucose (UA)  Negative  


 


Urine Ketones  Trace H  


 


Urine Blood  Negative  


 


Urine Nitrite  Positive H  


 


Urine Bilirubin  Negative  


 


Urine Urobilinogen  0.2  


 


Ur Leukocyte Esterase  Trace  


 


Urine WBC (Auto)  2  


 


Urine RBC (Auto)  0  


 


Urine Casts (Auto)  4  


 


U Epithel Cells (Auto)  2.0  


 


Urine Bacteria (Auto)  1761.8  














  04/19/19 04/19/19 04/20/19





  17:40 22:56 00:00


 


WBC   


 


RBC   


 


Hgb   


 


Hct   


 


MCV   


 


MCH   


 


MCHC   


 


RDW   


 


Plt Count   


 


MPV   


 


Absolute Neuts (auto)   


 


Neutrophils %   


 


Lymphocytes %   


 


Monocytes %   


 


Eosinophils %   


 


Basophils %   


 


Nucleated RBC %   


 


PT with INR  13.80 H  


 


INR  1.17 H  


 


PTT (Actin FS)  31.3  


 


Sodium   


 


Potassium   


 


Chloride   


 


Carbon Dioxide   


 


Anion Gap   


 


BUN   


 


Creatinine   


 


Creat Clearance w eGFR   


 


POC Glucometer   136 


 


Random Glucose   


 


Calcium   


 


Magnesium   


 


Total Bilirubin   


 


AST   


 


ALT   


 


Alkaline Phosphatase   


 


Creatine Kinase    112


 


Troponin I    < 0.02


 


Total Protein   


 


Albumin   


 


Urine Color   


 


Urine Appearance   


 


Urine pH   


 


Ur Specific Gravity   


 


Urine Protein   


 


Urine Glucose (UA)   


 


Urine Ketones   


 


Urine Blood   


 


Urine Nitrite   


 


Urine Bilirubin   


 


Urine Urobilinogen   


 


Ur Leukocyte Esterase   


 


Urine WBC (Auto)   


 


Urine RBC (Auto)   


 


Urine Casts (Auto)   


 


U Epithel Cells (Auto)   


 


Urine Bacteria (Auto)   














  04/20/19 04/20/19 04/20/19





  05:53 06:00 06:15


 


WBC    7.7


 


RBC    4.06


 


Hgb    12.1


 


Hct    35.6


 


MCV    87.6


 


MCH    29.7


 


MCHC    33.9


 


RDW    13.0


 


Plt Count    240


 


MPV    9.0


 


Absolute Neuts (auto)   


 


Neutrophils %   


 


Lymphocytes %   


 


Monocytes %   


 


Eosinophils %   


 


Basophils %   


 


Nucleated RBC %   


 


PT with INR   


 


INR   


 


PTT (Actin FS)   


 


Sodium   138 


 


Potassium   4.5 


 


Chloride   104 


 


Carbon Dioxide   28 


 


Anion Gap   6 L 


 


BUN   12 


 


Creatinine   0.8 


 


Creat Clearance w eGFR   70.12 


 


POC Glucometer  143  


 


Random Glucose   159 H 


 


Calcium   9.3 


 


Magnesium   1.7 L 


 


Total Bilirubin   


 


AST   


 


ALT   


 


Alkaline Phosphatase   


 


Creatine Kinase   


 


Troponin I   < 0.02 


 


Total Protein   


 


Albumin   


 


Urine Color   


 


Urine Appearance   


 


Urine pH   


 


Ur Specific Gravity   


 


Urine Protein   


 


Urine Glucose (UA)   


 


Urine Ketones   


 


Urine Blood   


 


Urine Nitrite   


 


Urine Bilirubin   


 


Urine Urobilinogen   


 


Ur Leukocyte Esterase   


 


Urine WBC (Auto)   


 


Urine RBC (Auto)   


 


Urine Casts (Auto)   


 


U Epithel Cells (Auto)   


 


Urine Bacteria (Auto)   














cardiology consult

## 2019-04-20 NOTE — CONS
DATE OF CONSULTATION:  

 

DATE OF DICTATION:  04/20/2019

 

CONSULTATION REQUESTED BY:  Katy Licea MD, coverage for Dr. Fran Schwab.

 

HISTORY:  History was obtained from the chart.  Patient with advanced organic brain

syndrome, advanced dementia.  A 73-year-old female resident of an extended care

facility with known history of coronary artery disease status post percutaneous

coronary intervention, stenting, angina pectoris, diastolic left ventricular

dysfunction with clinical class 0 [place] Heart Association classification left

ventricular failure, post transcutaneous aortic valve replacement for aortic valve

stenosis, hypertensive cardiovascular disease, diabetes mellitus,

hypercholesterolemia, organic brain syndrome, advanced dementia, who was transferred

to [facility] from her extended care facility after having an

acute psychotic episode and claiming chest discomfort.  Apparently sublingual

nitroglycerin was administered, but patient was transferred to the emergency room for

further evaluation and management.  Patient currently does not report any chest

discomfort.  No additional history is obtainable.  Cardiac biochemical markers have

been noted to be negative.  Electrocardiogram did not reveal any acute changes.  

 

PAST MEDICAL HISTORY:  Coronary artery disease status post percutaneous coronary

intervention, stenting; angina pectoris, diastolic left ventricular dysfunction with

clinical class 0 New [place] Heart Association classification left ventricular failure,

aortic valve disease; aortic valve stenosis, post transcutaneous aortic valve

replacement; hypertensive cardiovascular disease, diabetes mellitus,

hypercholesterolemia, organic brain syndrome, dementia, peripheral vascular disease.

 

SOCIAL HISTORY:  A resident of an extended care facility.

 

FAMILY HISTORY:  Not above.  

 

ALLERGIES:  None reported.  

 

MEDICATIONS:  Medical therapy at the [facility] included Victoza; vitamin

B12, 1000 mcg once daily; omeprazole 20 mg once daily; acetaminophen 325-mg, 2

tablets once daily; Januvia 100 mg once daily; vitamin C 500 mg once daily; Haldol

0.25 mg daily; Neurontin 300 mg daily; Norvasc 5 mg once daily; Celexa 20 mg once

daily; Plavix 75 mg once daily; vitamin D 1000 units once daily; ferrous sulfate 325

mg once daily; Ecotrin 81 mg once daily; Coreg 6.25 mg twice daily; Lipitor 10 mg

once daily; Colace 100 mg, 3 capsules once daily.

 

REVIEW OF SYSTEMS:  Not obtainable.  

 

PHYSICAL EXAMINATION:  

Vital Signs:  Blood pressure is 170/85 mmHg.  Pulse rate is 83 beats per minute.

Head and Neck:  Pupils equal and reacting to light and accommodation.  Extraocular

movements are intact.  Anicteric sclerae.  Negative JVD.  No bruit appreciated.  

Chest:  Clear to auscultation and percussion.

Cardiovascular:  S1, S2 regular.  Grade 1/6 systolic ejection murmur.  No clicks or

gallops. 

Abdomen:  Soft, benign.  Normoactive bowel sounds.  

Extremities:  Negative edema, 1+ distal pulses.  No calf tenderness.  

 

DIAGNOSTIC STUDIES:  Electrocardiogram revealed sinus rhythm with nonspecific T-wave

abnormality.  Troponin I levels were noted.  CBC revealed a white cell count 7.7,

hemoglobin 12.1, platelet count 240.  Basic metabolic profile with sodium 138,

potassium 4.5, BUN of 12, creatinine 0.8, glucose 159.

 

ASSESSMENT:  

1.  Chest pain syndrome, atypical for angina pectoris.

2.  Coronary artery disease post percutaneous coronary intervention, angina pectoris.

3.  Diastolic left ventricular dysfunction with clinical class 0 [place] Heart

Association classification left ventricular failure.  

4.  Aortic valve disease, aortic valve stenosis post transcutaneous aortic valve

replacement.  

5.  Hypertensive cardiovascular disease.  

6.  Diabetes mellitus.

7.  Hypercholesterolemia.  

8.  Organic brain syndrome.  

 

RECOMMENDATIONS:  

1.  Continuation of Coreg therapy.

2.  Recommendation addition of ACE inhibitor, angiotensin receptor blocker therapy

unless contraindicated.  

3.  Continuation of Lipitor therapy. 

4.  Continuation of Norvasc therapy.

5.  Continuation of aspirin and Plavix therapy. 

6.  Patient can be discharged to the above noted subacute facility from the

cardiovascular point of view in view of absence of evidence of acute coronary

syndrome and to follow up with Dr. Fran Schwab for additional cardiovascular

evaluation.  

 

Thank you for the kind referral.  

 

 

REHANA TSE M.D.

 

[id number]

DD: 04/20/2019 13:48

DT: 04/20/2019 14:47

Job #:  [id number]

## 2019-04-20 NOTE — PN
Progress Note (short form)





- Note


Progress Note: 


Coverage for Dr. GLADIS Schwab





Chief Complaint: Chart reviewed, events noted, reported chest pain with an 

acute psychotic episode in a patient with advanced organic brain syndrome/

dementia


 


History of Present Illness: 


Seen and examined on telemetry. Full consult dictated





Medications: 


 


 Current Medications





Aspirin (Asa -)  81 mg PO DAILY [facility]


   Last Admin: 04/20/19 09:02 Dose:  81 mg


Atorvastatin Calcium (Lipitor -)  10 mg PO [facility]


Carvedilol (Coreg -)  6.25 mg PO BID [facility]


   Last Admin: 04/20/19 09:02 Dose:  6.25 mg


Cholecalciferol (Vitamin D3 -)  1,000 unit PO DAILY [facility]


   Last Admin: 04/20/19 09:02 Dose:  1,000 unit


Citalopram Hydrobromide (Celexa -)  20 mg PO DAILY [facility]


   Last Admin: 04/20/19 09:02 Dose:  20 mg


Clopidogrel Bisulfate (Plavix -)  75 mg PO DAILY [facility]


   Last Admin: 04/20/19 09:02 Dose:  75 mg


Docusate Sodium (Colace -)  300 mg PO [facility]


   Last Admin: 04/19/19 22:56 Dose:  300 mg


Gabapentin (Neurontin -)  300 mg PO DAILY [facility]


   Last Admin: 04/20/19 09:02 Dose:  300 mg


Haloperidol (Haldol Liquid -)  0.25 mg PO BID [facility]


   Last Admin: 04/20/19 09:03 Dose:  0.25 mg


Heparin Sodium (Porcine) (Heparin -)  5,000 unit SQ BID [facility]


   Last Admin: 04/20/19 09:03 Dose:  5,000 unit


Insulin Aspart (Novolog Vial Sliding Scale -)  1 vial SQ [facility]; Protocol


   Last Admin: 04/20/19 11:55 Dose:  2 unit


Insulin Detemir (Levemir Vial)  5 units SQ [facility]


   Last Admin: 04/19/19 22:57 Dose:  5 units


Multivitamins/Minerals/Vitamin C (Tab-A-Vit -)  1 tab PO DAILY [facility]


   Last Admin: 04/20/19 09:02 Dose:  1 tab


Nitroglycerin (Nitrostat -)  0.4 mg SL Q5M PRN


   PRN Reason: FOR CHEST PAIN


Pantoprazole Sodium (Protonix -)  40 mg PO DAILY [facility]


   Last Admin: 04/20/19 09:02 Dose:  40 mg


Senna (Senna -)  1 tab PO HS PRN


   PRN Reason: CONSTIPATION





 Home Medications











 Medication  Instructions  Recorded


 


Acetaminophen [Mapap] 650 mg PO Q6H PRN 12/29/14


 


Citalopram Hydrobromide [Celexa -] 20 mg PO DAILY 12/29/14


 


Gabapentin 300 mg PO DAILY 12/29/14


 


Multivitamins [Multivit ([facility] 1 tab PO DAILY 12/29/14





Formulary)]  


 


Cholecalciferol (Vitamin D3) 1,000 unit PO DAILY 05/21/16





[Vitamin D3]  


 


Haloperidol [Haldol -] 0.25 mg PO BID 05/21/16


 


Sitagliptin Phosphate [Januvia] 100 mg PO DAILY 05/21/16


 


Aspirin 81 mg PO DAILY 04/19/19


 


Atorvastatin Calcium 10 mg PO DAILY 04/19/19


 


Carvedilol 6.25 mg PO BID 04/19/19


 


Clopidogrel Bisulfate [Clopidogrel] 75 mg PO DAILY 04/19/19


 


Cyanocobalamin (Vitamin B-12) 1,000 mcg IJ MONTHLY 04/19/19





[Cyanocobalamin Injection]  


 


Docusate Sodium [Colace -] 300 mg PO HS 04/19/19


 


Ferrous Sulfate 325 mg PO DAILY 04/19/19


 


Insulin Aspart [Novolog] 100 unit SQ [facility] 04/19/19


 


Insulin Detemir [Levemir Flextouch] 5 unit SQ HS 04/19/19


 


Lactobacillus Acidophilus [Bacid -] 1 each PO DAILY 04/19/19


 


Liraglutide [Victoza 3-Vernon] 1.8 mg SQ DAILY 04/19/19


 


Omeprazole 20 mg PO DAILY 04/19/19


 


Sennosides [Senna] 8.6 mg PO HS 04/19/19


 


Heparin - 5,000 unit SQ BID  vial 04/20/19


 


Insulin (Levemir) [Levemir Vial] 5 units SQ HS  units 04/20/19


 


Nitroglycerin Sublingual 0.4 mg SL Q5M PRN  tab 04/20/19





[Nitrostat -]  











Review of Systems





Unable to obtain





Vital Signs: 


 


 Last Vital Signs











Temp Pulse Resp BP Pulse Ox


 


 98.5 F   83   20   170/85   95 


 


 04/20/19 08:47  04/20/19 08:47  04/20/19 08:47  04/20/19 08:47  04/20/19 08:49








 Intake & Output











 04/17/19 04/18/19 04/19/19 04/20/19





 23:59 23:59 23:59 23:59


 


Intake Total   100 


 


Balance   100 


 


Weight   170 lb 











Constitutional: No Distress, Calm, Thin


Neck: Supple Negative JVD


Respiratory: Clear to A&P Bilaterally 


Cardiovascular: S1 S2 Regular Rate and Rhythm


Gastrointestinal: Soft Benign Normal Bowel Sounds


Ext: No Edema





Labs: 


 


 Troponin, BNP











  04/19/19 04/20/19 04/20/19





  17:40 00:00 06:00


 


Troponin I  < 0.02  < 0.02  < 0.02








 CBC, BMP





 04/20/19 06:15 





 04/20/19 06:00 





 Hepatic Panel











Total Bilirubin  0.3 mg/dL (0.2-1)   04/19/19  17:40    


 


AST  13 U/L (15-37)  L  04/19/19  17:40    


 


ALT  18 U/L (13-61)   04/19/19  17:40    


 


Alkaline Phosphatase  92 U/L ()   04/19/19  17:40    


 


Albumin  3.9 g/dl (3.4-5.0)   04/19/19  17:40    








 INR, PTT











INR  1.17  (0.83-1.09)  H  04/19/19  17:40    











Assessment/Plan





ASSESSMENT:





1. Chest pain syndrome atypical for angina pectoris


2. CAD post PCI angina pectoris


3. LV diastolic dysfunction with clinical class 0 NYHA classification LV failure


4. AS post TAVR


5. HTN/HCVD


6. DM


7. Hyperlipidemia


8. Organic brain syndrome/advanced dementia





PLAN:





1. Continue Coreg


2. Recommend ACEI or ARBS unless it is absolutely contraindicated 


3. Continue Lipitor 


4. Continue Norvasc


5. Continue ASA and Plavix


6. Can be D/C to SNF from the cardiovascular point of view and F/U with Dr. GLADIS Schwab for additional cardiovascular evaluation 





Thank you for the consult 














Rachid Thomas M.D.

## 2019-04-20 NOTE — EKG
Test Reason : 

Blood Pressure : ***/*** mmHG

Vent. Rate : 079 BPM     Atrial Rate : 079 BPM

   P-R Int : 172 ms          QRS Dur : 076 ms

    QT Int : 388 ms       P-R-T Axes : 041 -16 028 degrees

   QTc Int : 444 ms

 

*** POOR DATA QUALITY, INTERPRETATION MAY BE ADVERSELY AFFECTED

NORMAL SINUS RHYTHM

MODERATE VOLTAGE CRITERIA FOR LVH, MAY BE NORMAL VARIANT

BORDERLINE ECG

Confirmed by MD MANJULA, THONG ([id number]) on 4/20/2019 2:17:36 PM

 

Referred By:             Confirmed By:THONG FAIR MD

## 2019-04-20 NOTE — DS
Physical Examination


Vital Signs: 


 Vital Signs











Temperature  98.5 F   04/20/19 08:47


 


Pulse Rate  83   04/20/19 08:47


 


Respiratory Rate  20   04/20/19 08:47


 


Blood Pressure  170/85   04/20/19 08:47


 


O2 Sat by Pulse Oximetry (%)  95   04/20/19 08:49











Findings/Remarks: 





pt seen/ examined


comfortable


poor historian


no distress





Constitutional: Yes: No Distress, Calm


Eyes: Yes: Conjunctiva Clear


Neck: Yes: Supple


Cardiovascular: Yes: Regular Rate and Rhythm


Respiratory: Yes: CTA Bilaterally


Gastrointestinal: Yes: Soft


Edema: No


Labs: 


 CBC, BMP





 04/20/19 06:15 





 04/20/19 06:00 











Discharge Summary


Reason For Visit: Chest Pain


Current Active Problems





Chest pain (Acute)








Hospital Course: 





admitted for cp


mi ruled out


pt poor historian


discussed with cardiology


stable for d/c 


can do cardiac work up--  echo/ stress test as out pt basis


will d/c to nursing home today


meds reconcilled


discussed with nursing staff also





Condition: Stable





- Instructions


Disposition: SKILLED NURSING FACILITY





- Home Medications


Comprehensive Discharge Medication List: 


Ambulatory Orders





Acetaminophen [Mapap] 650 mg PO Q6H PRN 12/29/14 


Citalopram Hydrobromide [Celexa -] 20 mg PO DAILY 12/29/14 


Gabapentin 300 mg PO DAILY 12/29/14 


Multivitamins [Multivit ([facility] Formulary)] 1 tab PO DAILY 12/29/14 


Cholecalciferol (Vitamin D3) [Vitamin D3] 1,000 unit PO DAILY 05/21/16 


Haloperidol [Haldol -] 0.25 mg PO BID 05/21/16 


Sitagliptin Phosphate [Januvia] 100 mg PO DAILY 05/21/16 


Aspirin 81 mg PO DAILY 04/19/19 


Atorvastatin Calcium 10 mg PO DAILY 04/19/19 


Carvedilol 6.25 mg PO BID 04/19/19 


Clopidogrel Bisulfate [Clopidogrel] 75 mg PO DAILY 04/19/19 


Cyanocobalamin (Vitamin B-12) [Cyanocobalamin Injection] 1,000 mcg IJ MONTHLY 04 /19/19 


Docusate Sodium [Colace -] 300 mg PO HS 04/19/19 


Ferrous Sulfate 325 mg PO DAILY 04/19/19 


Insulin Aspart [Novolog] 100 unit SQ AC 04/19/19 


Insulin Detemir [Levemir Flextouch] 5 unit SQ HS 04/19/19 


Lactobacillus Acidophilus [Bacid -] 1 each PO DAILY 04/19/19 


Liraglutide [Victoza 3-Vernon] 1.8 mg SQ DAILY 04/19/19 


Omeprazole 20 mg PO DAILY 04/19/19 


Sennosides [Senna] 8.6 mg PO HS 04/19/19 


Heparin - 5,000 unit SQ BID  vial 04/20/19 


Insulin (Levemir) [Levemir Vial] 5 units SQ HS  units 04/20/19 


Nitroglycerin Sublingual [Nitrostat -] 0.4 mg SL Q5M PRN  tab 04/20/19

## 2019-07-11 ENCOUNTER — APPOINTMENT (OUTPATIENT)
Dept: ENDOCRINOLOGY | Facility: CLINIC | Age: 74
End: 2019-07-11

## 2021-10-06 PROBLEM — I10 ESSENTIAL HYPERTENSION: Status: ACTIVE | Noted: 2019-03-28
